# Patient Record
Sex: MALE | Race: ASIAN | NOT HISPANIC OR LATINO | Employment: FULL TIME | URBAN - METROPOLITAN AREA
[De-identification: names, ages, dates, MRNs, and addresses within clinical notes are randomized per-mention and may not be internally consistent; named-entity substitution may affect disease eponyms.]

---

## 2024-02-27 ENCOUNTER — TELEPHONE (OUTPATIENT)
Dept: ENDOCRINOLOGY | Facility: CLINIC | Age: 74
End: 2024-02-27
Payer: COMMERCIAL

## 2024-02-27 NOTE — TELEPHONE ENCOUNTER
Attempted to reach Vincent Garcíagilmar  To schedule appointment with Dr. Sue to establish care . Yarely Ha LPN

## 2024-03-26 ENCOUNTER — OFFICE VISIT (OUTPATIENT)
Dept: ENDOCRINOLOGY | Facility: CLINIC | Age: 74
End: 2024-03-26
Payer: COMMERCIAL

## 2024-03-26 VITALS
SYSTOLIC BLOOD PRESSURE: 132 MMHG | HEART RATE: 84 BPM | WEIGHT: 218.2 LBS | RESPIRATION RATE: 18 BRPM | TEMPERATURE: 97.7 F | BODY MASS INDEX: 34.25 KG/M2 | HEIGHT: 67 IN | DIASTOLIC BLOOD PRESSURE: 66 MMHG

## 2024-03-26 DIAGNOSIS — I25.10 CORONARY ARTERY ARTERIOSCLEROSIS: ICD-10-CM

## 2024-03-26 DIAGNOSIS — E11.65 UNCONTROLLED TYPE 2 DIABETES MELLITUS WITH HYPERGLYCEMIA (MULTI): Primary | ICD-10-CM

## 2024-03-26 PROCEDURE — 1160F RVW MEDS BY RX/DR IN RCRD: CPT | Performed by: INTERNAL MEDICINE

## 2024-03-26 PROCEDURE — 1159F MED LIST DOCD IN RCRD: CPT | Performed by: INTERNAL MEDICINE

## 2024-03-26 PROCEDURE — 4010F ACE/ARB THERAPY RXD/TAKEN: CPT | Performed by: INTERNAL MEDICINE

## 2024-03-26 PROCEDURE — 3078F DIAST BP <80 MM HG: CPT | Performed by: INTERNAL MEDICINE

## 2024-03-26 PROCEDURE — 1036F TOBACCO NON-USER: CPT | Performed by: INTERNAL MEDICINE

## 2024-03-26 PROCEDURE — 99204 OFFICE O/P NEW MOD 45 MIN: CPT | Performed by: INTERNAL MEDICINE

## 2024-03-26 PROCEDURE — 3075F SYST BP GE 130 - 139MM HG: CPT | Performed by: INTERNAL MEDICINE

## 2024-03-26 RX ORDER — LANOLIN ALCOHOL/MO/W.PET/CERES
CREAM (GRAM) TOPICAL
COMMUNITY

## 2024-03-26 RX ORDER — EMPAGLIFLOZIN 10 MG/1
10 TABLET, FILM COATED ORAL
COMMUNITY
Start: 2024-03-12 | End: 2024-03-26 | Stop reason: ALTCHOICE

## 2024-03-26 RX ORDER — LOSARTAN POTASSIUM 50 MG/1
50 TABLET ORAL 2 TIMES DAILY
COMMUNITY

## 2024-03-26 RX ORDER — GLIMEPIRIDE 2 MG/1
2 TABLET ORAL DAILY
COMMUNITY
End: 2024-03-26 | Stop reason: ALTCHOICE

## 2024-03-26 RX ORDER — ROSUVASTATIN CALCIUM 10 MG/1
10 TABLET, COATED ORAL
COMMUNITY
Start: 2013-12-05

## 2024-03-26 RX ORDER — METFORMIN HYDROCHLORIDE 500 MG/1
500 TABLET ORAL
COMMUNITY
Start: 2012-07-02 | End: 2024-05-07 | Stop reason: SDUPTHER

## 2024-03-26 RX ORDER — PANTOPRAZOLE SODIUM 40 MG/1
40 TABLET, DELAYED RELEASE ORAL DAILY
COMMUNITY

## 2024-03-26 RX ORDER — PIOGLITAZONEHYDROCHLORIDE 15 MG/1
15 TABLET ORAL DAILY
Qty: 90 TABLET | Refills: 3 | Status: SHIPPED | OUTPATIENT
Start: 2024-03-26 | End: 2024-05-07 | Stop reason: SDUPTHER

## 2024-03-26 RX ORDER — CLOPIDOGREL BISULFATE 75 MG/1
75 TABLET ORAL DAILY
COMMUNITY

## 2024-03-26 RX ORDER — TIRZEPATIDE 2.5 MG/.5ML
2.5 INJECTION, SOLUTION SUBCUTANEOUS
Qty: 2 ML | Refills: 5 | Status: SHIPPED | OUTPATIENT
Start: 2024-03-26 | End: 2024-05-07 | Stop reason: ALTCHOICE

## 2024-03-26 RX ORDER — METOPROLOL TARTRATE 50 MG/1
50 TABLET ORAL 2 TIMES DAILY
COMMUNITY
Start: 2023-07-25

## 2024-03-26 NOTE — PROGRESS NOTES
Consults    Reason For Consult  Diabetes     History Of Present Illness  Vincent Banks is a 73 y.o. male presenting with diabetes mellitus     Seen by Dr Morrison and Naheed at [facility]  Monitors few days a week  Hba1c 6.3   Here for medication optimization    He had severe reaction of nausea and vomiting to ozempic few years ago .     Diagnosed approximately 15 years ago diagnosed , during work up 2008 knee replacement   Started metformin   A1C 6.3 %   Dr. Reese suggested     Had Heart disease 2 years ago , [place]   CVA 2009     every other , daily     10/2023 ophthalmology    Ozempic had severe reaction- 3 days   Any family history of thyroid cancer? no  Any personal history of radiation to your head/neck? no    Past Medical History  He has a past medical history of Coronary artery disease and Type 2 diabetes mellitus (CMS/HCC).    Surgical History  He has a past surgical history that includes Knee surgery.     Social History  He reports that he has never smoked. He has never used smokeless tobacco. He reports that he does not drink alcohol and does not use drugs.    Family History  Family History   Problem Relation Name Age of Onset    Coronary artery disease Father          Allergies  Metformin, Ozempic [semaglutide], Erythromycin, Lisinopril, Sitagliptin, Sulfa (sulfonamide antibiotics), Metronidazole, and Tetracycline    Review of Systems    Past Medical History:   Diagnosis Date    Coronary artery disease     Type 2 diabetes mellitus (CMS/HCC)        Past Surgical History:   Procedure Laterality Date    KNEE SURGERY         Social History     Socioeconomic History    Marital status:      Spouse name: Not on file    Number of children: Not on file    Years of education: Not on file    Highest education level: Not on file   Occupational History    Not on file   Tobacco Use    Smoking status: Never    Smokeless tobacco: Never   Substance and Sexual Activity    Alcohol use: Never    Drug use: Never     "Sexual activity: Not Currently     Partners: Female   Other Topics Concern    Not on file   Social History Narrative    Not on file     Social Determinants of Health     Financial Resource Strain: Not on file   Food Insecurity: Not on file   Transportation Needs: Not on file   Physical Activity: Not on file   Stress: Not on file   Social Connections: Not on file   Intimate Partner Violence: Not on file   Housing Stability: Not on file        Physical Exam     ROS, PMH, FH/SH, surgical history and allergies have been reviewed.    Last Recorded Vitals  Blood pressure 132/66, pulse 84, temperature 36.5 °C (97.7 °F), resp. rate 18, height 1.702 m (5' 7\"), weight 99 kg (218 lb 3.2 oz).    Relevant Results         If you would like to pull in Medications, type .meds     If you would like to pull in Lab results for the last 24 hours, type .[id number]    If you would like to pull in specific Lab results, type .ll     If you would like to pull in Imaging results, type .imgrslt :99}  Lab Review  No results found for: \"BILITOT\", \"CALCIUM\", \"CO2\", \"CL\", \"CREATININE\", \"GLUCOSE\", \"ALKPHOS\", \"K\", \"PROT\", \"NA\", \"AST\", \"ALT\", \"BUN\", \"ANIONGAP\", \"MG\", \"PHOS\", \"GGT\", \"LDH\", \"ALBUMIN\", \"AMYLASE\", \"LIPASE\", \"GFRF\", \"GFRMALE\"  No results found for: \"TRIG\", \"CHOL\", \"LDLCALC\", \"HDL\"  Lab Results   Component Value Date    HGBA1C 6.3 (H) 02/21/2024    HGBA1C 6.6 (H) 09/27/2023    HGBA1C 6.7 (H) 05/26/2023     The ASCVD Risk score (Zabrina WELCH, et al., 2019) failed to calculate for the following reasons:    The patient has a prior MI or stroke diagnosis       Assessment/Plan   Problem List Items Addressed This Visit    None  Visit Diagnoses         Codes    Uncontrolled type 2 diabetes mellitus with hyperglycemia (CMS/HCC)    -  Primary E11.65    Relevant Medications    empagliflozin (Jardiance) 25 mg    pioglitazone (Actos) 15 mg tablet    tirzepatide (Mounjaro) 2.5 mg/0.5 mL pen injector    Coronary artery arteriosclerosis     I25.10    " Relevant Medications    clopidogrel (Plavix) 75 mg tablet    metoprolol tartrate (Lopressor) 50 mg tablet            Uncontrolled diabete stype 2 with coronary heart disease and stroke  I suggest   Hold sulfonylurea  Decrease actos (due to weight gain BMI 34)  Try higher dose jardiance   And add mounjaro as he might be able to tolerate this compared to ozempic at this time     To see pharm D in 2-4 weeks  One of our NP in 3-6 months   And myself in 3 months    Education suggested         I spent a total of 45+ minutes on the date of the service which included preparing to see the patient, face-to-face patient care, completing clinical documentation, obtaining and / or reviewing separately obtained history, counseling and educating the patient/family/caregiver, ordering medications, tests, or procedures, communicating with other healthcare providers (not separately reported), independently interpreting results, not separately reported, and communicating results to the patient/family/caregiver,   with patient's verbal consent.  Name and date of birth verified.    Phuc Sue MD

## 2024-03-27 ENCOUNTER — TELEPHONE (OUTPATIENT)
Dept: ENDOCRINOLOGY | Facility: CLINIC | Age: 74
End: 2024-03-27
Payer: COMMERCIAL

## 2024-03-27 NOTE — TELEPHONE ENCOUNTER
[facility] of [facility]  for medication Mounjaro . Office awaiting determination. Yarely Ha LPN

## 2024-03-28 ENCOUNTER — TELEPHONE (OUTPATIENT)
Dept: ENDOCRINOLOGY | Facility: CLINIC | Age: 74
End: 2024-03-28
Payer: COMMERCIAL

## 2024-03-28 NOTE — TELEPHONE ENCOUNTER
Further documentation confirming diabetes diagnosis faxed to [facility] @ [phone] as requested. Yarely Ha LPN

## 2024-04-01 ENCOUNTER — TELEPHONE (OUTPATIENT)
Dept: ENDOCRINOLOGY | Facility: CLINIC | Age: 74
End: 2024-04-01
Payer: COMMERCIAL

## 2024-04-01 NOTE — TELEPHONE ENCOUNTER
Spoke to patient and let him know that his prior authorization for Mounjaro has been approved by the insurance . Yarely Ha LPN

## 2024-04-01 NOTE — TELEPHONE ENCOUNTER
Left message for Vincent Banks   Regarding scheduling 3 month follow up per Doctor . Scheduled patient for 07/09/2024 @ 1:30. Ask patient to call office to confirm if this is ok  or if her prefers a virtual appointment  in June. Yarely Ha LPN

## 2024-05-07 ENCOUNTER — CLINICAL SUPPORT (OUTPATIENT)
Dept: ENDOCRINOLOGY | Facility: CLINIC | Age: 74
End: 2024-05-07
Payer: COMMERCIAL

## 2024-05-07 DIAGNOSIS — E11.65 UNCONTROLLED TYPE 2 DIABETES MELLITUS WITH HYPERGLYCEMIA (MULTI): ICD-10-CM

## 2024-05-07 PROCEDURE — 99211 OFF/OP EST MAY X REQ PHY/QHP: CPT | Performed by: PHARMACIST

## 2024-05-07 RX ORDER — PIOGLITAZONEHYDROCHLORIDE 15 MG/1
15 TABLET ORAL DAILY
Qty: 90 TABLET | Refills: 3 | Status: SHIPPED | OUTPATIENT
Start: 2024-05-07 | End: 2025-05-07

## 2024-05-07 RX ORDER — METFORMIN HYDROCHLORIDE 500 MG/1
1000 TABLET, EXTENDED RELEASE ORAL
Qty: 360 TABLET | Refills: 3 | Status: SHIPPED | OUTPATIENT
Start: 2024-05-07 | End: 2025-05-07

## 2024-05-07 RX ORDER — METFORMIN HYDROCHLORIDE 500 MG/1
1000 TABLET ORAL
Qty: 360 TABLET | Refills: 3 | Status: SHIPPED | OUTPATIENT
Start: 2024-05-07 | End: 2024-05-07 | Stop reason: ALTCHOICE

## 2024-05-07 NOTE — PROGRESS NOTES
"Clinical Pharmacy Team met with Vincent Darren  regarding a consultation for diabetes management thanks to a refferal from Dr. Phuc Sue MD. Below is a summary of our conversation and recommendations:    Recommendations:  Continue all DM medications as prescribed  ________________________________________________________________________      Allergies   Allergen Reactions    Metformin GI Upset     METFORMIN GLUCOPHAGE CAUSES PATIENT SEVERE GI UPSET.    Ozempic [Semaglutide] Nausea/vomiting    Erythromycin Hives    Lisinopril Cough    Sitagliptin Other     Pancreatitis    Sulfa (Sulfonamide Antibiotics) Hives and Itching     RASH    Metronidazole Rash     Pancreatitis    Tetracycline Rash     RASH     Objective     There were no vitals taken for this visit.    Diabetes Pharmacotherapy:    Jardiance 25 mg one tablet daily  Metformin xr 500 mg two tablets twice daily  Pioglitazone 15 mg once daily      Lab Review  No results found for: \"BILITOT\", \"CALCIUM\", \"CO2\", \"CL\", \"CREATININE\", \"GLUCOSE\", \"ALKPHOS\", \"K\", \"PROT\", \"NA\", \"AST\", \"ALT\", \"BUN\", \"ANIONGAP\", \"MG\", \"PHOS\", \"GGT\", \"LDH\", \"ALBUMIN\", \"AMYLASE\", \"LIPASE\", \"GFRF\", \"GFRMALE\"  No results found for: \"TRIG\", \"CHOL\", \"LDLCALC\", \"HDL\"  Lab Results   Component Value Date    HGBA1C 6.3 (H) 02/21/2024    HGBA1C 6.6 (H) 09/27/2023    HGBA1C 6.7 (H) 05/26/2023     The ASCVD Risk score (Zabrina WELCH, et al., 2019) failed to calculate for the following reasons:    The patient has a prior MI or stroke diagnosis      HPI:  Diagnosed with T2DM around age 60  Patient has hx of stroke in 2010  NSTEMI 7/14/2021   He has had episodes of pancreatitis of unclear etiology. He was admitted to the hospital in 1/2023 with abdominal pain and was found to have another episode of pancreatitis.     Monitoring     Most recent A1c on 2/21/24 at goal    Assessment/Plan     The patient reports today for a diabetes consultation. Discussed DM regimen with the patient. A1C is at goal. Patient " reports not being able to fill mounjaro due to shortages. Given his hx of recurrent pancreatitis of unclear etiology and controlled glucose readings recommend discontinuing mounjaro. He is getting cardio protection from jardiance which has been showing to reduce all cause mortality and cardiac related mortality in patients with T2DM and established CVD. Most recent LDL at goal. Follows up with cardiology at [facility]. Recommend getting updated urine albumin this year. Patient was agreeable to these recommendations.         PATIENT EDUCATION/GOALS    Goals  Fasting B - 130 mg/dL  Postprandial BG: less than 180 mg/dL  A1c: less than 7%      Provided counseling on lifestyle modifications, medications, and self-monitoring. Patient has no additional questions at this time. Pharmacy to follow up on request. Please reach out with any questions. Thank you.       Brandie Cook, PharmD    Provider on site: Dr. Phuc Sue MD  Continue all meds under the continuation of care with the referring provider and clinical pharmacy team.

## 2024-07-02 ENCOUNTER — APPOINTMENT (OUTPATIENT)
Dept: ENDOCRINOLOGY | Facility: CLINIC | Age: 74
End: 2024-07-02
Payer: COMMERCIAL

## 2024-07-02 VITALS — HEIGHT: 67 IN | BODY MASS INDEX: 33.74 KG/M2 | WEIGHT: 215 LBS

## 2024-07-02 DIAGNOSIS — E11.65 UNCONTROLLED TYPE 2 DIABETES MELLITUS WITH HYPERGLYCEMIA (MULTI): ICD-10-CM

## 2024-07-02 LAB — POC HEMOGLOBIN A1C: 7.1 % (ref 4.2–6.5)

## 2024-07-02 PROCEDURE — 1159F MED LIST DOCD IN RCRD: CPT | Performed by: INTERNAL MEDICINE

## 2024-07-02 PROCEDURE — 99213 OFFICE O/P EST LOW 20 MIN: CPT | Performed by: INTERNAL MEDICINE

## 2024-07-02 PROCEDURE — 1036F TOBACCO NON-USER: CPT | Performed by: INTERNAL MEDICINE

## 2024-07-02 PROCEDURE — 4010F ACE/ARB THERAPY RXD/TAKEN: CPT | Performed by: INTERNAL MEDICINE

## 2024-07-02 PROCEDURE — 83036 HEMOGLOBIN GLYCOSYLATED A1C: CPT | Performed by: INTERNAL MEDICINE

## 2024-07-02 PROCEDURE — G2211 COMPLEX E/M VISIT ADD ON: HCPCS | Performed by: INTERNAL MEDICINE

## 2024-07-02 RX ORDER — EMPAGLIFLOZIN 10 MG/1
10 TABLET, FILM COATED ORAL
COMMUNITY
End: 2024-07-02 | Stop reason: ALTCHOICE

## 2024-07-02 RX ORDER — TRAZODONE HYDROCHLORIDE 100 MG/1
2 TABLET ORAL NIGHTLY
COMMUNITY
Start: 2024-05-28

## 2024-07-02 RX ORDER — AMOXICILLIN AND CLAVULANATE POTASSIUM 875; 125 MG/1; MG/1
1 TABLET, FILM COATED ORAL
COMMUNITY
Start: 2024-06-12

## 2024-07-02 RX ORDER — CHLORHEXIDINE GLUCONATE ORAL RINSE 1.2 MG/ML
SOLUTION DENTAL
COMMUNITY
Start: 2024-06-12

## 2024-07-02 RX ORDER — GLIMEPIRIDE 2 MG/1
1 TABLET ORAL
COMMUNITY
Start: 2024-06-08

## 2024-07-02 RX ORDER — LATANOPROST 50 UG/ML
1 SOLUTION/ DROPS OPHTHALMIC
COMMUNITY
Start: 2024-04-25 | End: 2025-04-25

## 2024-07-02 RX ORDER — MULTIVITAMIN
1 TABLET ORAL
COMMUNITY

## 2024-07-02 RX ORDER — ROSUVASTATIN CALCIUM 20 MG/1
20 TABLET, COATED ORAL DAILY
COMMUNITY

## 2024-07-02 RX ORDER — HYDROCODONE BITARTRATE AND ACETAMINOPHEN 5; 325 MG/1; MG/1
TABLET ORAL
COMMUNITY
Start: 2024-06-12

## 2024-07-02 RX ORDER — ACETAMINOPHEN 500 MG
TABLET ORAL
COMMUNITY
Start: 2024-06-12

## 2024-07-02 RX ORDER — BLOOD-GLUCOSE METER
EACH MISCELLANEOUS
COMMUNITY
Start: 2023-08-23

## 2024-07-02 NOTE — PROGRESS NOTES
Consults    Reason For Consult  Diabetes     History Of Present Illness  Vincent Banks is a 74 y.o. male presenting with diabetes typw 2 .     Seen by Dr Morrison and Naheed at [facility] prior  Monitors few days a week  Hba1c 7.1 % today          He had severe reaction of nausea and vomiting to ozempic few years ago . ALSO HISTORY OF PANCREATITIS      Diagnosed approximately 15 years ago diagnosed , during work up 2008 knee replacement   Started metformin   Dr. Reese suggested      Had Heart disease 2 years ago , [place]   CVA 2009        10/2023 ophthalmology   Any family history of thyroid cancer? no  Any personal history of radiation to your head/neck? no    Past Medical History  He has a past medical history of Coronary artery disease, Pancreatitis, chronic (Multi), and Type 2 diabetes mellitus (Multi).    Surgical History  He has a past surgical history that includes Knee surgery.     Social History  He reports that he has never smoked. He has never used smokeless tobacco. He reports that he does not drink alcohol and does not use drugs.    Family History  Family History   Problem Relation Name Age of Onset    Coronary artery disease Father          Allergies  Metformin, Ozempic [semaglutide], Erythromycin, Lisinopril, Sitagliptin, Sulfa (sulfonamide antibiotics), Metronidazole, and Tetracycline    Review of Systems    Past Medical History:   Diagnosis Date    Coronary artery disease     Pancreatitis, chronic (Multi)     last 1/2023 , had four episodes    Type 2 diabetes mellitus (Multi)        Past Surgical History:   Procedure Laterality Date    KNEE SURGERY         Social History     Socioeconomic History    Marital status:      Spouse name: Not on file    Number of children: Not on file    Years of education: Not on file    Highest education level: Not on file   Occupational History    Not on file   Tobacco Use    Smoking status: Never    Smokeless tobacco: Never   Substance and Sexual Activity    Alcohol use:  "Never    Drug use: Never    Sexual activity: Not Currently     Partners: Female   Other Topics Concern    Not on file   Social History Narrative    Not on file     Social Determinants of Health     Financial Resource Strain: Not on file   Food Insecurity: Not on file   Transportation Needs: Not on file   Physical Activity: Not on file   Stress: Not on file   Social Connections: Not on file   Intimate Partner Violence: Not on file   Housing Stability: Not on file        Physical Exam     ROS, PMH, FH/SH, surgical history and allergies have been reviewed.    Last Recorded Vitals  Height 1.702 m (5' 7\"), weight 97.5 kg (215 lb).    Relevant Results         If you would like to pull in Medications, type .meds     If you would like to pull in Lab results for the last 24 hours, type .[id number]    If you would like to pull in specific Lab results, type .ll     If you would like to pull in Imaging results, type .imgrslt :99}  Lab Review  No results found for: \"BILITOT\", \"CALCIUM\", \"CO2\", \"CL\", \"CREATININE\", \"GLUCOSE\", \"ALKPHOS\", \"K\", \"PROT\", \"NA\", \"AST\", \"ALT\", \"BUN\", \"ANIONGAP\", \"MG\", \"PHOS\", \"GGT\", \"LDH\", \"ALBUMIN\", \"AMYLASE\", \"LIPASE\", \"GFRF\", \"GFRMALE\"  No results found for: \"TRIG\", \"CHOL\", \"LDLCALC\", \"HDL\"  Lab Results   Component Value Date    HGBA1C 7.1 (A) 07/02/2024    HGBA1C 6.3 (H) 02/21/2024    HGBA1C 6.6 (H) 09/27/2023     The ASCVD Risk score (Zabrina WELCH, et al., 2019) failed to calculate for the following reasons:    The patient has a prior MI or stroke diagnosis       Assessment/Plan   Problem List Items Addressed This Visit    None  Visit Diagnoses         Codes    Uncontrolled type 2 diabetes mellitus with hyperglycemia (Multi)     E11.65    Relevant Medications    empagliflozin (Jardiance) 25 mg    Other Relevant Orders    POCT glycosylated hemoglobin (Hb A1C) manually resulted (Completed)    Hemoglobin A1C            Hba1c 7.1 , possibly affected by recent dental work and steroid use      Suggest increase " jardiance 25   Repeat hba1c 3 months     Due alb / creat ratio    NO GLP-1 HE HAS HISTORY OF CHRONIC PANCREATITIS        I spent a total of 30+ minutes on the date of the service which included preparing to see the patient, face-to-face patient care, completing clinical documentation, obtaining and / or reviewing separately obtained history, counseling and educating the patient/family/caregiver, ordering medications, tests, or procedures, communicating with other healthcare providers (not separately reported), independently interpreting results, not separately reported, and communicating results to the patient/family/caregiver, .  Name and date of birth verified.        Phuc Sue MD

## 2024-07-09 ENCOUNTER — APPOINTMENT (OUTPATIENT)
Dept: ENDOCRINOLOGY | Facility: CLINIC | Age: 74
End: 2024-07-09
Payer: COMMERCIAL

## 2024-07-27 ENCOUNTER — APPOINTMENT (OUTPATIENT)
Dept: DERMATOLOGY | Facility: CLINIC | Age: 74
End: 2024-07-27
Payer: COMMERCIAL

## 2024-08-21 DIAGNOSIS — E11.65 UNCONTROLLED TYPE 2 DIABETES MELLITUS WITH HYPERGLYCEMIA (MULTI): ICD-10-CM

## 2024-08-21 RX ORDER — PIOGLITAZONEHYDROCHLORIDE 15 MG/1
15 TABLET ORAL DAILY
Qty: 90 TABLET | Refills: 3 | Status: SHIPPED | OUTPATIENT
Start: 2024-08-21 | End: 2025-08-21

## 2024-08-24 ENCOUNTER — APPOINTMENT (OUTPATIENT)
Dept: DERMATOLOGY | Facility: CLINIC | Age: 74
End: 2024-08-24
Payer: COMMERCIAL

## 2024-09-23 DIAGNOSIS — E11.65 UNCONTROLLED TYPE 2 DIABETES MELLITUS WITH HYPERGLYCEMIA: ICD-10-CM

## 2024-09-23 RX ORDER — PIOGLITAZONEHYDROCHLORIDE 15 MG/1
15 TABLET ORAL DAILY
Qty: 90 TABLET | Refills: 3 | Status: SHIPPED | OUTPATIENT
Start: 2024-09-23 | End: 2025-09-23

## 2024-10-22 DIAGNOSIS — E11.65 UNCONTROLLED TYPE 2 DIABETES MELLITUS WITH HYPERGLYCEMIA: ICD-10-CM

## 2024-10-23 RX ORDER — PIOGLITAZONEHYDROCHLORIDE 15 MG/1
15 TABLET ORAL DAILY
Qty: 90 TABLET | Refills: 3 | Status: SHIPPED | OUTPATIENT
Start: 2024-10-23 | End: 2025-10-23

## 2024-11-25 DIAGNOSIS — E11.65 UNCONTROLLED TYPE 2 DIABETES MELLITUS WITH HYPERGLYCEMIA: ICD-10-CM

## 2024-11-26 RX ORDER — PIOGLITAZONEHYDROCHLORIDE 15 MG/1
15 TABLET ORAL DAILY
Qty: 90 TABLET | Refills: 3 | Status: SHIPPED | OUTPATIENT
Start: 2024-11-26 | End: 2025-11-26

## 2025-01-23 ENCOUNTER — APPOINTMENT (OUTPATIENT)
Dept: ENDOCRINOLOGY | Facility: CLINIC | Age: 75
End: 2025-01-23
Payer: COMMERCIAL

## 2025-01-23 DIAGNOSIS — E11.65 UNCONTROLLED TYPE 2 DIABETES MELLITUS WITH HYPERGLYCEMIA: Primary | ICD-10-CM

## 2025-01-23 DIAGNOSIS — I25.10 CORONARY ARTERY ARTERIOSCLEROSIS: ICD-10-CM

## 2025-01-23 PROCEDURE — 4010F ACE/ARB THERAPY RXD/TAKEN: CPT | Performed by: INTERNAL MEDICINE

## 2025-01-23 PROCEDURE — 99214 OFFICE O/P EST MOD 30 MIN: CPT | Performed by: INTERNAL MEDICINE

## 2025-01-23 PROCEDURE — G2211 COMPLEX E/M VISIT ADD ON: HCPCS | Performed by: INTERNAL MEDICINE

## 2025-01-23 NOTE — PROGRESS NOTES
Consults    Reason For Consult  Diabetes     History Of Present Illness  Vincent Banks is a 74 y.o. male presenting with diabetes type 2 .     Type 2 diabetes   Cardiology   Ophthalmology 10/2024   Weight loss 206 lbs - on diet - stopped snacking  Exercise , not much       Seen by Dr Gaspar at [facility] prior  Monitors few days a week  in general  Hba1c 7.1 %  7/2024       He had severe reaction of nausea and vomiting to ozempic few years ago . ALSO HISTORY OF PANCREATITIS      Diagnosed approximately 15 years ago diagnosed , during work up 2008 knee replacement   Started metformin   Dr. Reese suggested      Had Heart disease 2 years ago , [place]   CVA 2009    Any family history of thyroid cancer? no  Any personal history of radiation to your head/neck? no    Past Medical History  He has a past medical history of Coronary artery disease, Pancreatitis, chronic (Multi), and Type 2 diabetes mellitus (Multi).    Surgical History  He has a past surgical history that includes Knee surgery.     Social History  He reports that he has never smoked. He has never used smokeless tobacco. He reports that he does not drink alcohol and does not use drugs.    Family History  Family History   Problem Relation Name Age of Onset    Coronary artery disease Father          Allergies  Metformin, Ozempic [semaglutide], Erythromycin, Lisinopril, Sitagliptin, Sulfa (sulfonamide antibiotics), Metronidazole, and Tetracycline    Review of Systems    Past Medical History:   Diagnosis Date    Coronary artery disease     Pancreatitis, chronic (Multi)     last 1/2023 , had four episodes    Type 2 diabetes mellitus (Multi)        Past Surgical History:   Procedure Laterality Date    KNEE SURGERY         Social History     Socioeconomic History    Marital status:      Spouse name: Not on file    Number of children: Not on file    Years of education: Not on file    Highest education level: Not on file   Occupational History    Not on  "file   Tobacco Use    Smoking status: Never    Smokeless tobacco: Never   Substance and Sexual Activity    Alcohol use: Never    Drug use: Never    Sexual activity: Not Currently     Partners: Female   Other Topics Concern    Not on file   Social History Narrative    Not on file     Social Drivers of Health     Financial Resource Strain: Not on file   Food Insecurity: Not on file   Transportation Needs: Not on file   Physical Activity: Not on file   Stress: Not on file   Social Connections: Not on file   Intimate Partner Violence: Not on file   Housing Stability: Not on file        Physical Exam     ROS, PMH, FH/SH, surgical history and allergies have been reviewed.    Last Recorded Vitals  There were no vitals taken for this visit.    Relevant Results         If you would like to pull in Medications, type .meds     If you would like to pull in Lab results for the last 24 hours, type .[id number]    If you would like to pull in specific Lab results, type .ll     If you would like to pull in Imaging results, type .imgrslt :99}  Lab Review  No results found for: \"BILITOT\", \"CALCIUM\", \"CO2\", \"CL\", \"CREATININE\", \"GLUCOSE\", \"ALKPHOS\", \"K\", \"PROT\", \"NA\", \"AST\", \"ALT\", \"BUN\", \"ANIONGAP\", \"MG\", \"PHOS\", \"GGT\", \"LDH\", \"ALBUMIN\", \"AMYLASE\", \"LIPASE\", \"GFRF\", \"GFRMALE\"  No results found for: \"TRIG\", \"CHOL\", \"LDLCALC\", \"HDL\"  Lab Results   Component Value Date    HGBA1C 7.1 (A) 07/02/2024    HGBA1C 6.3 (H) 02/21/2024    HGBA1C 6.6 (H) 09/27/2023     The ASCVD Risk score (Zabrina WELCH, et al., 2019) failed to calculate for the following reasons:    Risk score cannot be calculated because patient has a medical history suggesting prior/existing ASCVD'     Assessment/Plan   Problem List Items Addressed This Visit    None  Visit Diagnoses         Codes    Uncontrolled type 2 diabetes mellitus with hyperglycemia    -  Primary E11.65    Relevant Orders    CBC    Comprehensive Metabolic Panel    Albumin-Creatinine Ratio, Urine Random    " Hemoglobin A1C    Lipid Panel    Follow Up In Endocrinology    Coronary artery arteriosclerosis     I25.10    Relevant Orders    CBC    Comprehensive Metabolic Panel    Albumin-Creatinine Ratio, Urine Random    Hemoglobin A1C    Lipid Panel    Follow Up In Endocrinology            Discussed monitoring   Seen podiatry recently for bunion  Seen ophthalmology recently no retinopathy  Due labs   Orders placed  No refill needed, reviewed meds        I spent a total of 30+ minutes on the date of the service which included preparing to see the patient, face-to-face patient care, completing clinical documentation, obtaining and / or reviewing separately obtained history, counseling and educating the patient/family/caregiver, ordering medications, tests, or procedures, communicating with other healthcare providers (not separately reported), independently interpreting results, not separately reported, and communicating results to the patient/family/caregiver, real-time telemedicine visit using audiovisual technology with patient's verbal consent.  Name and date of birth verified.      Phuc Sue MD

## 2025-06-19 ENCOUNTER — APPOINTMENT (OUTPATIENT)
Dept: NEPHROLOGY | Facility: CLINIC | Age: 75
End: 2025-06-19

## 2025-06-19 VITALS — HEART RATE: 64 BPM | SYSTOLIC BLOOD PRESSURE: 124 MMHG | DIASTOLIC BLOOD PRESSURE: 63 MMHG

## 2025-06-19 DIAGNOSIS — I10 ESSENTIAL HYPERTENSION: Primary | ICD-10-CM

## 2025-06-19 PROCEDURE — 99203 OFFICE O/P NEW LOW 30 MIN: CPT | Performed by: INTERNAL MEDICINE

## 2025-06-19 PROCEDURE — 1159F MED LIST DOCD IN RCRD: CPT | Performed by: INTERNAL MEDICINE

## 2025-06-19 PROCEDURE — 3074F SYST BP LT 130 MM HG: CPT | Performed by: INTERNAL MEDICINE

## 2025-06-19 PROCEDURE — 1036F TOBACCO NON-USER: CPT | Performed by: INTERNAL MEDICINE

## 2025-06-19 PROCEDURE — 3078F DIAST BP <80 MM HG: CPT | Performed by: INTERNAL MEDICINE

## 2025-06-19 RX ORDER — LATANOPROST 50 UG/ML
SOLUTION/ DROPS OPHTHALMIC
COMMUNITY

## 2025-06-19 RX ORDER — AMLODIPINE BESYLATE 2.5 MG/1
2.5 TABLET ORAL NIGHTLY
COMMUNITY

## 2025-06-19 RX ORDER — CLOTRIMAZOLE AND BETAMETHASONE DIPROPIONATE 10; .64 MG/G; MG/G
CREAM TOPICAL
COMMUNITY
Start: 2025-01-15

## 2025-06-19 RX ORDER — VALSARTAN 160 MG/1
320 TABLET ORAL DAILY
Qty: 60 TABLET | Refills: 11 | Status: SHIPPED | OUTPATIENT
Start: 2025-06-19 | End: 2026-06-19
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
## 2025-07-10 ENCOUNTER — APPOINTMENT (OUTPATIENT)
Dept: ENDOCRINOLOGY | Facility: CLINIC | Age: 75
End: 2025-07-10
Payer: COMMERCIAL

## 2025-09-09 ENCOUNTER — APPOINTMENT (OUTPATIENT)
Dept: ENDOCRINOLOGY | Facility: CLINIC | Age: 75
End: 2025-09-09

## 2026-03-26 ENCOUNTER — APPOINTMENT (OUTPATIENT)
Dept: ENDOCRINOLOGY | Facility: CLINIC | Age: 76
End: 2026-03-26